# Patient Record
Sex: FEMALE | Race: WHITE | NOT HISPANIC OR LATINO | Employment: FULL TIME | URBAN - METROPOLITAN AREA
[De-identification: names, ages, dates, MRNs, and addresses within clinical notes are randomized per-mention and may not be internally consistent; named-entity substitution may affect disease eponyms.]

---

## 2019-01-09 ENCOUNTER — APPOINTMENT (EMERGENCY)
Dept: RADIOLOGY | Facility: HOSPITAL | Age: 29
End: 2019-01-09
Payer: COMMERCIAL

## 2019-01-09 ENCOUNTER — HOSPITAL ENCOUNTER (EMERGENCY)
Facility: HOSPITAL | Age: 29
Discharge: HOME | End: 2019-01-09
Attending: EMERGENCY MEDICINE | Admitting: EMERGENCY MEDICINE
Payer: COMMERCIAL

## 2019-01-09 VITALS
WEIGHT: 185 LBS | TEMPERATURE: 98.3 F | BODY MASS INDEX: 30.82 KG/M2 | RESPIRATION RATE: 17 BRPM | SYSTOLIC BLOOD PRESSURE: 111 MMHG | DIASTOLIC BLOOD PRESSURE: 76 MMHG | HEART RATE: 68 BPM | OXYGEN SATURATION: 94 % | HEIGHT: 65 IN

## 2019-01-09 DIAGNOSIS — S06.0X9A CONCUSSION: Primary | ICD-10-CM

## 2019-01-09 PROCEDURE — 70480 CT ORBIT/EAR/FOSSA W/O DYE: CPT

## 2019-01-09 PROCEDURE — 99284 EMERGENCY DEPT VISIT MOD MDM: CPT

## 2019-01-09 RX ORDER — IBUPROFEN 600 MG/1
600 TABLET ORAL ONCE
Status: COMPLETED | OUTPATIENT
Start: 2019-01-09 | End: 2019-01-09

## 2019-01-09 RX ORDER — TETRACAINE HYDROCHLORIDE 5 MG/ML
2 SOLUTION OPHTHALMIC ONCE
Status: COMPLETED | OUTPATIENT
Start: 2019-01-09 | End: 2019-01-09

## 2019-01-09 RX ADMIN — FLUORESCEIN SODIUM 1 STRIP: 1 STRIP OPHTHALMIC at 16:41

## 2019-01-09 RX ADMIN — TETRACAINE HYDROCHLORIDE 2 DROP: 5 SOLUTION OPHTHALMIC at 16:40

## 2019-01-09 RX ADMIN — IBUPROFEN 600 MG: 600 TABLET ORAL at 17:20

## 2019-01-09 NOTE — ED ATTENDING ATTESTATION
I, Samantha Dong DO, saw and evaluated the patient  I have discussed the patient with the resident/non-physician practitioner and agree with the resident's/non-physician practitioner's findings, Plan of Care, and MDM as documented in the resident's/non-physician practitioner's note, except where noted  All available labs and Radiology studies were reviewed  At this point I agree with the current assessment done in the Emergency Department  I have conducted an independent evaluation of this patient a history and physical is as follows:      Critical Care Time  CritCare Time    Procedures     29 yr old fem hit in head yesterday by a soccer ball that was kicked from about 5 yds away  Hit the right side of her face  No LOC or neck pain  Bright flashes of light on the left side then some black ness  Vision better today  Mild headache and scalp tenderness  No diplopia but funny sensation behind her eye  Exm; [facility] good; no diplopia; cheek sensation equal bilaterally  No neck pain  Right scalp tender wo swelling  Pupils equal   BS US neg for bleed or retinal detachment  Pln: CT facial bones and eye referral, concussion instr

## 2019-01-09 NOTE — ED PROVIDER NOTES
History  Chief Complaint   Patient presents with    Head Injury     hit on right side of face with a soccer ball last night  denies LOC but reports loss of vision in periphery on right eye  states the vision loss is positional  reports her "face feeling sunken in"  reports posterior head pain despite contact being anterior     29year-old female presents for right-sided facial trauma  Patient was playing soccer yesterday evening with standing in attempt to block a free kicked roughly 5-10 yd away and took a and cake directly to the right side of her face  She did not the lose consciousness but she did go to the ground  Soccer ball struck her in the right periorbital region  She initially had discomfort around the eye the but no direct bony tenderness around the eye or to the skull/C-spine  Later in the evening she developed visual symptoms described as flashes is out of the right eye the medial aspect of her vision taking 1/3 of the visual field on the medial aspect of the right eye  At no time did she have any diplopia  The flashing lights turned to a gray color and eventually to black later in the evening  She states they would transiently resolve her she would have no symptoms but would be reproduced when she would lay for prolonged periods on her left side  Visual symptoms have greatly improved today but does have mild symptoms if lying on the left side for prolonged times  She denies any bony tenderness but has pain deeper within the eye on the right side  She takes no daily medications  She was wearing contacts at the time but remove them later that evening  She denies any foreign body sensation to the right eye  No tearing or crusting to the eye  She currently denies any visual symptoms  On ros she reports mild diffuse HA  On exam patient has no bony tenderness around the orbit nasal bone  Extraocular motion intact no evidence of entrapment, no nystagmus  No reproduction of symptoms  Visual fields intact  Follow-up visual acuity 20/20 on the left 20/25 on the right  Bedside ultrasound reveals no globe rupture, vitreous hemorrhage, retinal detachment  None       Past Medical History:   Diagnosis Date    Hip dysplasia     Lyme disease        Past Surgical History:   Procedure Laterality Date    HIP SURGERY         No family history on file  I have reviewed and agree with the history as documented  Social History   Substance Use Topics    Smoking status: Never Smoker    Smokeless tobacco: Not on file    Alcohol use No        Review of Systems   Constitutional: Negative for chills, fatigue and fever  HENT: Negative for congestion, ear pain, postnasal drip, rhinorrhea, sinus pressure and trouble swallowing  Eyes: Positive for pain and visual disturbance  Negative for photophobia  Respiratory: Negative for cough, chest tightness, shortness of breath and wheezing  Cardiovascular: Negative for chest pain and palpitations  Gastrointestinal: Negative for abdominal distention, abdominal pain, constipation, diarrhea, nausea and vomiting  Genitourinary: Negative for dysuria, hematuria and urgency  Musculoskeletal: Negative for arthralgias, back pain, myalgias, neck pain and neck stiffness  Skin: Negative for rash  Neurological: Negative for dizziness, weakness, numbness and headaches  Hematological: Negative for adenopathy  Physical Exam  ED Triage Vitals [01/09/19 1520]   Temperature Pulse Respirations Blood Pressure SpO2   98 3 °F (36 8 °C) 82 18 140/59 97 %      Temp Source Heart Rate Source Patient Position - Orthostatic VS BP Location FiO2 (%)   Oral Monitor Sitting Right arm --      Pain Score       5           Orthostatic Vital Signs  Vitals:    01/09/19 1520 01/09/19 1719   BP: 140/59 111/76   Pulse: 82 68   Patient Position - Orthostatic VS: Sitting Lying       Physical Exam   Constitutional: She is oriented to person, place, and time   Vital signs are normal  She appears well-developed and well-nourished  She does not appear ill  No distress  HENT:   Head: Normocephalic and atraumatic  Head is without abrasion and without contusion  Right Ear: Tympanic membrane normal    Left Ear: Tympanic membrane normal    Nose: Nose normal  No mucosal edema, rhinorrhea, nose lacerations or sinus tenderness  No epistaxis  Right sinus exhibits no maxillary sinus tenderness and no frontal sinus tenderness  Left sinus exhibits no maxillary sinus tenderness and no frontal sinus tenderness  Mouth/Throat: Uvula is midline, oropharynx is clear and moist and mucous membranes are normal    Eyes: Pupils are equal, round, and reactive to light  Conjunctivae, EOM and lids are normal  Right eye exhibits no chemosis, no discharge and no exudate  Left eye exhibits no chemosis, no discharge and no exudate  Right conjunctiva is not injected  Right conjunctiva has no hemorrhage  Left conjunctiva is not injected  Left conjunctiva has no hemorrhage  Right eye exhibits normal extraocular motion and no nystagmus  Left eye exhibits normal extraocular motion and no nystagmus  Right pupil is round and reactive  Left pupil is round and reactive  Pupils are equal    Fundoscopic exam:       The right eye shows no hemorrhage and no papilledema  The left eye shows no hemorrhage and no papilledema  Slit lamp exam:       The right eye shows no hyphema  The left eye shows no hyphema  Negative Seidels  Extraocular motion intact  No evidence of entrapment  No hyphema  Funduscopic exam unremarkable  Bedside ultrasound reveals no vitreous hemorrhage, globe rupture, retinal detachment  No visual field deficits  Pupils 4 mm equal round reactive to light bilaterally  Neck: Trachea normal, normal range of motion, full passive range of motion without pain and phonation normal  Neck supple  No JVD present  No spinous process tenderness and no muscular tenderness present   Carotid bruit is not present  Normal range of motion present  No thyromegaly present  Cardiovascular: Normal rate, regular rhythm and intact distal pulses  Exam reveals no friction rub  No murmur heard  Pulmonary/Chest: Effort normal and breath sounds normal  No accessory muscle usage or stridor  No tachypnea  No respiratory distress  She has no decreased breath sounds  She has no wheezes  She has no rhonchi  She has no rales  She exhibits no tenderness, no crepitus, no edema and no retraction  Abdominal: Soft  Normal appearance and bowel sounds are normal  She exhibits no distension  There is no tenderness  There is no rigidity, no rebound, no guarding and no CVA tenderness  Musculoskeletal: Normal range of motion  Lymphadenopathy:     She has no cervical adenopathy  Neurological: She is alert and oriented to person, place, and time  She has normal strength  No sensory deficit  GCS eye subscore is 4  GCS verbal subscore is 5  GCS motor subscore is 6  Unremarkable cranial nerve exam  Pupils 4 mm equal round reactive to light  No nystagmus, normal extraocular motion  5 out of 5 upper and lower extremity strength  No subjective sensory deficits to the face, upper or lower extremity  Normal finger-nose and heel shin  Skin: Skin is warm, dry and intact  No rash noted  She is not diaphoretic  Psychiatric: She has a normal mood and affect  Nursing note and vitals reviewed  ED Medications  Medications   fluorescein sodium sterile ophthalmic strip 1 strip (1 strip Both Eyes Given by Other 1/9/19 1641)   tetracaine 0 5 % ophthalmic solution 2 drop (2 drops Both Eyes Given by Other 1/9/19 1640)   ibuprofen (MOTRIN) tablet 600 mg (600 mg Oral Given 1/9/19 1720)       Diagnostic Studies  Results Reviewed     None                 CT orbits/temporal bones/skull base wo contrast   Final Result by Markell Dukes MD (01/09 1726)      No acute CT findings        Workstation performed: [id number] Procedures  Procedures      Phone Consults  ED Phone Contact    ED Course  ED Course as of Vlad 10 0055   Wed Jan 09, 2019   1647 Bedside US unremarkable R orbit  No vitreous hemorrhage or retinal detachment  1817 IOP 17/13/17 R eye  15/16/13 L eye                                [facility]  CritCare Time    Disposition  Final diagnoses:   Concussion     Time reflects when diagnosis was documented in both MDM as applicable and the Disposition within this note     Time User Action Codes Description Comment    1/9/2019  6:15 PM Almetta Space S Add [S06 [id number]] Concussion       ED Disposition     ED Disposition Condition Comment    Discharge  Miguel HarrisonAnais discharge to home/self care  Condition at discharge: Good        Follow-up Information     Follow up With Specialties Details Why Contact Info    Reynaldo Schmitz MD Ophthalmology Schedule an appointment as soon as possible for a visit in 1 day For re-check [address]  Leonie Long MD Family Medicine Schedule an appointment as soon as possible for a visit in 1 week For re-check [address]  [address] [place] [address]            There are no discharge medications for this patient  No discharge procedures on file  ED Provider  Attending physically available and evaluated Alejocurry Anais HERNDON managed the patient along with the ED Attending      Electronically Signed by         Venkata Ely DO  01/10/19 1985

## 2019-01-09 NOTE — DISCHARGE INSTRUCTIONS
Follow up with your eye doctor or with the information provided today to be seen in the next 24 hours for your eye symptoms today  Restrain from physical activity / sports for one week and let your symptoms be your guide to returning to full activity related to your headache and concussion symptoms  Return with worsening headache, vomiting, confusion or any other concerning symptoms return for further evaluation  Concussion   WHAT YOU NEED TO KNOW:   A concussion is a mild brain injury  It is usually caused by a bump or blow to the head from a fall, a motor vehicle crash, or a sports injury  Sometimes being shaken forcefully may cause a concussion  DISCHARGE INSTRUCTIONS:   Have someone else call 911 for the following:   · Someone tries to wake you and cannot do so  · You have a seizure, increasing confusion, or a change in personality  · Your speech becomes slurred, or you have new vision problems  Return to the emergency department if:   · You have a severe headache that does not go away  · You have arm or leg weakness, numbness, or new problems with coordination  · You have blood or clear fluid coming out of the ears or nose  Contact your healthcare provider if:   · You have nausea or are vomiting  · You feel more sleepy than usual     · Your symptoms get worse  · Your symptoms last longer than 6 weeks after the injury  · You have questions or concerns about your condition or care  Medicines:   · Acetaminophen  helps to decrease pain  It is available without a doctor's order  Ask how much to take and how often to take it  Follow directions  Acetaminophen can cause liver damage if not taken correctly  · NSAIDs , such as ibuprofen, help decrease swelling and pain  NSAIDs can cause stomach bleeding or kidney problems in certain people  If you take blood thinner medicine, always ask your healthcare provider if NSAIDs are safe for you   Always read the medicine label and follow directions  · Take your medicine as directed  Contact your healthcare provider if you think your medicine is not helping or if you have side effects  Tell him or her if you are allergic to any medicine  Keep a list of the medicines, vitamins, and herbs you take  Include the amounts, and when and why you take them  Bring the list or the pill bottles to follow-up visits  Carry your medicine list with you in case of an emergency  Follow up with your healthcare provider as directed:  Write down your questions so you remember to ask them during your visits  Self-care:   · Rest  from physical and mental activities as directed  Mental activities are those that require thinking, concentration, and attention  You will need to rest until your symptoms are gone  Rest will allow you to recover from your concussion  Ask your healthcare provider when you can return to work and other daily activities  · Have someone stay with you for the first 24 hours after your injury  Your healthcare provider should be contacted if your symptoms get worse, or you develop new symptoms  · Do not participate in sports and physical activities until your healthcare provider says it is okay  They could make your symptoms worse or lead to another concussion  Your healthcare provider will tell you when it is okay for you to return to sports or physical activities  Prevent another concussion:   · Wear protective sports equipment that fit properly  Helmets help decrease your risk of a serious brain injury  Talk to your healthcare provider about ways you can decrease your risk for a concussion if you play sports  · Wear your seat belt  every time you travel  This helps to decrease your risk of a head injury if you are in a car accident  © 2017 [employer] Information is for End User's use only and may not be sold, redistributed or otherwise used for commercial purposes   All illustrations and images included in CareNotes® are the copyrighted property of [employer]  or [employer] Valencia  The above information is an  only  It is not intended as medical advice for individual conditions or treatments  Talk to your doctor, nurse or pharmacist before following any medical regimen to see if it is safe and effective for you